# Patient Record
Sex: FEMALE | Race: WHITE | NOT HISPANIC OR LATINO
[De-identification: names, ages, dates, MRNs, and addresses within clinical notes are randomized per-mention and may not be internally consistent; named-entity substitution may affect disease eponyms.]

---

## 2017-02-01 ENCOUNTER — APPOINTMENT (OUTPATIENT)
Dept: OBGYN | Facility: CLINIC | Age: 72
End: 2017-02-01

## 2017-07-11 ENCOUNTER — OUTPATIENT (OUTPATIENT)
Dept: OUTPATIENT SERVICES | Facility: HOSPITAL | Age: 72
LOS: 1 days | Discharge: HOME | End: 2017-07-11

## 2017-07-11 DIAGNOSIS — C50.919 MALIGNANT NEOPLASM OF UNSPECIFIED SITE OF UNSPECIFIED FEMALE BREAST: ICD-10-CM

## 2017-07-14 ENCOUNTER — APPOINTMENT (OUTPATIENT)
Dept: HEMATOLOGY ONCOLOGY | Facility: CLINIC | Age: 72
End: 2017-07-14

## 2017-07-14 VITALS
HEART RATE: 89 BPM | BODY MASS INDEX: 26.53 KG/M2 | SYSTOLIC BLOOD PRESSURE: 132 MMHG | DIASTOLIC BLOOD PRESSURE: 88 MMHG | WEIGHT: 145.06 LBS | RESPIRATION RATE: 16 BRPM | OXYGEN SATURATION: 98 % | TEMPERATURE: 97.8 F

## 2017-10-18 ENCOUNTER — FORM ENCOUNTER (OUTPATIENT)
Age: 72
End: 2017-10-18

## 2017-10-19 ENCOUNTER — OUTPATIENT (OUTPATIENT)
Dept: OUTPATIENT SERVICES | Facility: HOSPITAL | Age: 72
LOS: 1 days | End: 2017-10-19
Payer: MEDICARE

## 2017-10-19 ENCOUNTER — APPOINTMENT (OUTPATIENT)
Dept: ULTRASOUND IMAGING | Facility: IMAGING CENTER | Age: 72
End: 2017-10-19
Payer: MEDICARE

## 2017-10-19 ENCOUNTER — APPOINTMENT (OUTPATIENT)
Dept: MAMMOGRAPHY | Facility: IMAGING CENTER | Age: 72
End: 2017-10-19
Payer: MEDICARE

## 2017-10-19 DIAGNOSIS — C50.919 MALIGNANT NEOPLASM OF UNSPECIFIED SITE OF UNSPECIFIED FEMALE BREAST: ICD-10-CM

## 2017-10-19 PROCEDURE — G0202: CPT | Mod: 26

## 2017-10-19 PROCEDURE — 76641 ULTRASOUND BREAST COMPLETE: CPT | Mod: 26,50

## 2017-10-19 PROCEDURE — 77063 BREAST TOMOSYNTHESIS BI: CPT | Mod: 26

## 2017-10-19 PROCEDURE — 77067 SCR MAMMO BI INCL CAD: CPT

## 2017-10-19 PROCEDURE — 77063 BREAST TOMOSYNTHESIS BI: CPT

## 2017-10-19 PROCEDURE — 76641 ULTRASOUND BREAST COMPLETE: CPT

## 2018-08-21 ENCOUNTER — APPOINTMENT (OUTPATIENT)
Dept: OBGYN | Facility: CLINIC | Age: 73
End: 2018-08-21

## 2018-09-04 ENCOUNTER — APPOINTMENT (OUTPATIENT)
Dept: OBGYN | Facility: CLINIC | Age: 73
End: 2018-09-04
Payer: MEDICARE

## 2018-09-04 PROCEDURE — 99214 OFFICE O/P EST MOD 30 MIN: CPT

## 2018-10-05 ENCOUNTER — OUTPATIENT (OUTPATIENT)
Dept: OUTPATIENT SERVICES | Facility: HOSPITAL | Age: 73
LOS: 1 days | Discharge: HOME | End: 2018-10-05

## 2018-10-05 DIAGNOSIS — C50.919 MALIGNANT NEOPLASM OF UNSPECIFIED SITE OF UNSPECIFIED FEMALE BREAST: ICD-10-CM

## 2018-10-15 ENCOUNTER — APPOINTMENT (OUTPATIENT)
Dept: HEMATOLOGY ONCOLOGY | Facility: CLINIC | Age: 73
End: 2018-10-15
Payer: MEDICARE

## 2018-10-15 VITALS
TEMPERATURE: 98.4 F | OXYGEN SATURATION: 99 % | SYSTOLIC BLOOD PRESSURE: 124 MMHG | DIASTOLIC BLOOD PRESSURE: 72 MMHG | HEART RATE: 106 BPM | RESPIRATION RATE: 16 BRPM | WEIGHT: 141.1 LBS | BODY MASS INDEX: 25.81 KG/M2

## 2018-10-15 DIAGNOSIS — I42.9 CARDIOMYOPATHY, UNSPECIFIED: ICD-10-CM

## 2018-10-15 DIAGNOSIS — G40.909 EPILEPSY, UNSPECIFIED, NOT INTRACTABLE, W/OUT STATUS EPILEPTICUS: ICD-10-CM

## 2018-10-15 PROCEDURE — 99215 OFFICE O/P EST HI 40 MIN: CPT

## 2018-10-17 ENCOUNTER — APPOINTMENT (OUTPATIENT)
Dept: OBGYN | Facility: CLINIC | Age: 73
End: 2018-10-17
Payer: MEDICARE

## 2018-10-17 ENCOUNTER — ASOB RESULT (OUTPATIENT)
Age: 73
End: 2018-10-17

## 2018-10-17 PROCEDURE — 76830 TRANSVAGINAL US NON-OB: CPT

## 2018-10-21 ENCOUNTER — FORM ENCOUNTER (OUTPATIENT)
Age: 73
End: 2018-10-21

## 2018-10-22 ENCOUNTER — APPOINTMENT (OUTPATIENT)
Dept: ULTRASOUND IMAGING | Facility: IMAGING CENTER | Age: 73
End: 2018-10-22

## 2018-10-22 ENCOUNTER — OUTPATIENT (OUTPATIENT)
Dept: OUTPATIENT SERVICES | Facility: HOSPITAL | Age: 73
LOS: 1 days | End: 2018-10-22
Payer: MEDICARE

## 2018-10-22 ENCOUNTER — APPOINTMENT (OUTPATIENT)
Dept: MAMMOGRAPHY | Facility: IMAGING CENTER | Age: 73
End: 2018-10-22
Payer: MEDICARE

## 2018-10-22 DIAGNOSIS — C50.919 MALIGNANT NEOPLASM OF UNSPECIFIED SITE OF UNSPECIFIED FEMALE BREAST: ICD-10-CM

## 2018-10-22 PROCEDURE — 77063 BREAST TOMOSYNTHESIS BI: CPT | Mod: 26

## 2018-10-22 PROCEDURE — 77067 SCR MAMMO BI INCL CAD: CPT | Mod: 26

## 2018-10-22 PROCEDURE — 76641 ULTRASOUND BREAST COMPLETE: CPT | Mod: 26,50

## 2018-10-22 PROCEDURE — 77067 SCR MAMMO BI INCL CAD: CPT

## 2018-10-22 PROCEDURE — 76641 ULTRASOUND BREAST COMPLETE: CPT

## 2018-10-22 PROCEDURE — 77063 BREAST TOMOSYNTHESIS BI: CPT

## 2018-12-03 ENCOUNTER — APPOINTMENT (OUTPATIENT)
Dept: OBGYN | Facility: CLINIC | Age: 73
End: 2018-12-03
Payer: MEDICARE

## 2018-12-03 ENCOUNTER — ASOB RESULT (OUTPATIENT)
Age: 73
End: 2018-12-03

## 2018-12-03 PROCEDURE — 36415 COLL VENOUS BLD VENIPUNCTURE: CPT

## 2018-12-03 PROCEDURE — 76830 TRANSVAGINAL US NON-OB: CPT

## 2018-12-18 ENCOUNTER — OTHER (OUTPATIENT)
Age: 73
End: 2018-12-18

## 2018-12-19 ENCOUNTER — APPOINTMENT (OUTPATIENT)
Dept: OBGYN | Facility: CLINIC | Age: 73
End: 2018-12-19
Payer: MEDICARE

## 2018-12-19 DIAGNOSIS — N84.0 POLYP OF CORPUS UTERI: ICD-10-CM

## 2018-12-19 DIAGNOSIS — R93.89 ABNORMAL FINDINGS ON DIAGNOSTIC IMAGING OF OTHER SPECIFIED BODY STRUCTURES: ICD-10-CM

## 2018-12-19 PROCEDURE — 58558Z: CUSTOM

## 2018-12-21 ENCOUNTER — APPOINTMENT (OUTPATIENT)
Dept: OBGYN | Facility: CLINIC | Age: 73
End: 2018-12-21
Payer: MEDICARE

## 2018-12-21 VITALS
WEIGHT: 143 LBS | BODY MASS INDEX: 26.31 KG/M2 | HEIGHT: 62 IN | DIASTOLIC BLOOD PRESSURE: 84 MMHG | SYSTOLIC BLOOD PRESSURE: 145 MMHG

## 2018-12-21 DIAGNOSIS — N93.9 ABNORMAL UTERINE AND VAGINAL BLEEDING, UNSPECIFIED: ICD-10-CM

## 2018-12-21 PROCEDURE — 99212 OFFICE O/P EST SF 10 MIN: CPT

## 2018-12-25 LAB — CORE LAB BIOPSY: NORMAL

## 2019-02-11 ENCOUNTER — APPOINTMENT (OUTPATIENT)
Dept: OBGYN | Facility: CLINIC | Age: 74
End: 2019-02-11
Payer: MEDICARE

## 2019-02-11 VITALS
HEIGHT: 62 IN | BODY MASS INDEX: 25.76 KG/M2 | SYSTOLIC BLOOD PRESSURE: 157 MMHG | DIASTOLIC BLOOD PRESSURE: 89 MMHG | WEIGHT: 140 LBS

## 2019-02-11 DIAGNOSIS — N83.201 UNSPECIFIED OVARIAN CYST, RIGHT SIDE: ICD-10-CM

## 2019-02-11 PROCEDURE — 82270 OCCULT BLOOD FECES: CPT

## 2019-02-11 PROCEDURE — 87210 SMEAR WET MOUNT SALINE/INK: CPT | Mod: QW

## 2019-02-11 PROCEDURE — G0101: CPT

## 2019-02-11 NOTE — PHYSICAL EXAM
[Awake] : awake [Alert] : alert [Acute Distress] : no acute distress [Mass] : no breast mass [Nipple Discharge] : no nipple discharge [Axillary LAD] : no axillary lymphadenopathy [Soft] : soft [Tender] : non tender [Oriented x3] : oriented to person, place, and time [Vulvar Atrophy] : vulvar atrophy [Normal] : uterus [Atrophy] : atrophy [No Bleeding] : there was no active vaginal bleeding [Uterine Adnexae] : were not tender and not enlarged [No Tenderness] : no rectal tenderness [Occult Blood] : occult blood test from digital rectal exam was negative [Nl Sphincter Tone] : normal sphincter tone [External Hemorrhoid] : an external hemorrhoid [RRR, No Murmurs] : RRR, no murmurs [CTAB] : CTAB [FreeTextEntry4] : 2x4 cm midline mass posterior wall

## 2019-02-14 LAB — CYTOLOGY CVX/VAG DOC THIN PREP: NORMAL

## 2019-09-30 ENCOUNTER — RESULT REVIEW (OUTPATIENT)
Age: 74
End: 2019-09-30

## 2019-10-17 ENCOUNTER — OUTPATIENT (OUTPATIENT)
Dept: OUTPATIENT SERVICES | Facility: HOSPITAL | Age: 74
LOS: 1 days | Discharge: HOME | End: 2019-10-17

## 2019-10-17 DIAGNOSIS — C50.919 MALIGNANT NEOPLASM OF UNSPECIFIED SITE OF UNSPECIFIED FEMALE BREAST: ICD-10-CM

## 2019-10-23 ENCOUNTER — APPOINTMENT (OUTPATIENT)
Dept: HEMATOLOGY ONCOLOGY | Facility: CLINIC | Age: 74
End: 2019-10-23
Payer: MEDICARE

## 2019-10-23 VITALS
DIASTOLIC BLOOD PRESSURE: 73 MMHG | WEIGHT: 137.35 LBS | SYSTOLIC BLOOD PRESSURE: 137 MMHG | TEMPERATURE: 97.8 F | RESPIRATION RATE: 16 BRPM | OXYGEN SATURATION: 97 % | HEART RATE: 67 BPM | BODY MASS INDEX: 25.12 KG/M2

## 2019-10-23 PROCEDURE — 99215 OFFICE O/P EST HI 40 MIN: CPT

## 2019-10-23 NOTE — OB HISTORY
[Currently In Menopause] : currently in menopause [Definite:  ___ (Date)] : the last menstrual period was [unfilled]

## 2019-10-23 NOTE — PHYSICAL EXAM
[Fully active, able to carry on all pre-disease performance without restriction] : Status 0 - Fully active, able to carry on all pre-disease performance without restriction [Normal] : affect appropriate [de-identified] : thyroid surgery scar [de-identified] : midline scar from cardiac surgery, PACEMAKER IN THE LEFT CHEST WLL [de-identified] : left lumpectomy scar

## 2019-10-23 NOTE — ASSESSMENT
[FreeTextEntry1] : This is a 73-year-old lady who was diagnosed with stage IA left breast cancer (ER negative, HER-2/salas positive), status post left lumpectomy and axillary lymph node biopsy on 5/2/2008, status post Taxol Herceptin chemotherapy and radiation therapy.\par \par Breast ca- Clinically JOSHUA. Mammogram and sonogram due this month. Prescripiton given. 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 \par Cardiomyopathy- stable. F/u cardio. No cardiac side effects of Herceptin.\par Ovarian cyst- F/B OB GYN. \par Seizures- Not cancer related. Controlled with anti epileptics. .\par Will obtain Recent BW from Dr Rubin\par Osteopenia- Continue ca+vit D. \par \par EDUCATED ABOUT S/SX OF RECURRENCE. Unlikely to recur after 11 yrs. REFERRAL MADE TO BE F/B [facility]\par \par RTO 1 y WITH LIVAN

## 2019-10-23 NOTE — HISTORY OF PRESENT ILLNESS
[Disease: _____________________] : Disease: [unfilled] [T: ___] : T[unfilled] [M: ___] : M[unfilled] [N: ___] : N[unfilled] [AJCC Stage: ____] : AJCC Stage: [unfilled] [de-identified] : This is a 74-year-old lady who was diagnosed with stage IA left breast cancer (ER negative, HER-2/salas positive), status post left lumpectomy and axillary lymph node biopsy on 5/2/2008, status post Taxol Herceptin chemotherapy  and radiation therapy. [de-identified] : poorly differentiated invasive ductal carcinoma, ER/SC negative HER-2/salas overexpression. [de-identified] : Annual followup visit for stage IA left breast cancer (ER negative, HER-2/salas positive), status post left lumpectomy and axillary lymph node biopsy on 5/2/2008, status post Taxol Herceptin chemotherapy  and radiation therapy. HERE FOR ANNUAL VISIT\par She underwent open heart surgery and valve repair in 4/2019. She is doing well. No cardio pulm sx. Sister and daughter helping out. She was in cardiac rehab x 2 m\par She has h/o migraine induced seizures. No seizures since last visit. She is f/b neuro\par Mammo/sono 10/2018: BIRADS 2 due this month\par GYN- Dr Chavez, She is followed for ovarian cyst\par Dexa 2018, osteopenia, f/w rheum, takes ca+vit D\par No bone pain. no wt loss, energy ok, appetite good.\par has mild neuropathy in right leg, taking cymbalta and gabapentin\par Metformin switched to levemir for DM\par ROS negative\par Routine BW by PCP (Caryn)- normal.

## 2019-10-28 ENCOUNTER — FORM ENCOUNTER (OUTPATIENT)
Age: 74
End: 2019-10-28

## 2019-10-29 ENCOUNTER — OUTPATIENT (OUTPATIENT)
Dept: OUTPATIENT SERVICES | Facility: HOSPITAL | Age: 74
LOS: 1 days | End: 2019-10-29
Payer: MEDICARE

## 2019-10-29 ENCOUNTER — APPOINTMENT (OUTPATIENT)
Dept: MAMMOGRAPHY | Facility: IMAGING CENTER | Age: 74
End: 2019-10-29
Payer: MEDICARE

## 2019-10-29 ENCOUNTER — APPOINTMENT (OUTPATIENT)
Dept: ULTRASOUND IMAGING | Facility: IMAGING CENTER | Age: 74
End: 2019-10-29
Payer: MEDICARE

## 2019-10-29 DIAGNOSIS — C50.919 MALIGNANT NEOPLASM OF UNSPECIFIED SITE OF UNSPECIFIED FEMALE BREAST: ICD-10-CM

## 2019-10-29 PROCEDURE — 76641 ULTRASOUND BREAST COMPLETE: CPT | Mod: 26,50

## 2019-10-29 PROCEDURE — 77063 BREAST TOMOSYNTHESIS BI: CPT | Mod: 26

## 2019-10-29 PROCEDURE — 77063 BREAST TOMOSYNTHESIS BI: CPT

## 2019-10-29 PROCEDURE — 76641 ULTRASOUND BREAST COMPLETE: CPT

## 2019-10-29 PROCEDURE — 77067 SCR MAMMO BI INCL CAD: CPT | Mod: 26

## 2019-10-29 PROCEDURE — 77067 SCR MAMMO BI INCL CAD: CPT

## 2020-03-02 ENCOUNTER — APPOINTMENT (OUTPATIENT)
Dept: OBGYN | Facility: CLINIC | Age: 75
End: 2020-03-02
Payer: MEDICARE

## 2020-03-02 VITALS
BODY MASS INDEX: 26.13 KG/M2 | SYSTOLIC BLOOD PRESSURE: 114 MMHG | WEIGHT: 142 LBS | HEIGHT: 62 IN | DIASTOLIC BLOOD PRESSURE: 74 MMHG

## 2020-03-02 DIAGNOSIS — D21.9 BENIGN NEOPLASM OF CONNECTIVE AND OTHER SOFT TISSUE, UNSPECIFIED: ICD-10-CM

## 2020-03-02 DIAGNOSIS — N90.5 ATROPHY OF VULVA: ICD-10-CM

## 2020-03-02 DIAGNOSIS — N83.202 UNSPECIFIED OVARIAN CYST, LEFT SIDE: ICD-10-CM

## 2020-03-02 DIAGNOSIS — K64.4 RESIDUAL HEMORRHOIDAL SKIN TAGS: ICD-10-CM

## 2020-03-02 DIAGNOSIS — N95.2 POSTMENOPAUSAL ATROPHIC VAGINITIS: ICD-10-CM

## 2020-03-02 PROCEDURE — 87210 SMEAR WET MOUNT SALINE/INK: CPT | Mod: QW

## 2020-03-02 PROCEDURE — 99214 OFFICE O/P EST MOD 30 MIN: CPT

## 2020-03-02 NOTE — PHYSICAL EXAM
[Acute Distress] : no acute distress [Awake] : awake [Alert] : alert [Mass] : no breast mass [Soft] : soft [Nipple Discharge] : no nipple discharge [Axillary LAD] : no axillary lymphadenopathy [Tender] : non tender [Oriented x3] : oriented to person, place, and time [Vulvar Atrophy] : vulvar atrophy [Atrophy] : atrophy [Normal] : uterus [No Tenderness] : no rectal tenderness [Uterine Adnexae] : were not tender and not enlarged [No Bleeding] : there was no active vaginal bleeding [Nl Sphincter Tone] : normal sphincter tone [Occult Blood] : occult blood test from digital rectal exam was negative [RRR, No Murmurs] : RRR, no murmurs [External Hemorrhoid] : an external hemorrhoid [FreeTextEntry4] : 2x4 cm midline mass posterior wall [CTAB] : CTAB

## 2020-10-16 ENCOUNTER — OUTPATIENT (OUTPATIENT)
Dept: OUTPATIENT SERVICES | Facility: HOSPITAL | Age: 75
LOS: 1 days | Discharge: HOME | End: 2020-10-16

## 2020-10-16 DIAGNOSIS — U07.0 VAPING-RELATED DISORDER: ICD-10-CM

## 2020-10-19 ENCOUNTER — APPOINTMENT (OUTPATIENT)
Dept: HEMATOLOGY ONCOLOGY | Facility: CLINIC | Age: 75
End: 2020-10-19

## 2020-11-23 ENCOUNTER — OUTPATIENT (OUTPATIENT)
Dept: OUTPATIENT SERVICES | Facility: HOSPITAL | Age: 75
LOS: 1 days | Discharge: HOME | End: 2020-11-23

## 2020-11-23 DIAGNOSIS — C50.919 MALIGNANT NEOPLASM OF UNSPECIFIED SITE OF UNSPECIFIED FEMALE BREAST: ICD-10-CM

## 2020-11-30 ENCOUNTER — APPOINTMENT (OUTPATIENT)
Dept: HEMATOLOGY ONCOLOGY | Facility: CLINIC | Age: 75
End: 2020-11-30
Payer: MEDICARE

## 2020-11-30 VITALS
DIASTOLIC BLOOD PRESSURE: 85 MMHG | HEART RATE: 83 BPM | BODY MASS INDEX: 25.76 KG/M2 | RESPIRATION RATE: 16 BRPM | SYSTOLIC BLOOD PRESSURE: 128 MMHG | OXYGEN SATURATION: 98 % | HEIGHT: 61.97 IN | WEIGHT: 139.99 LBS | TEMPERATURE: 97.2 F

## 2020-11-30 PROCEDURE — 99215 OFFICE O/P EST HI 40 MIN: CPT

## 2020-11-30 RX ORDER — PHYTONADIONE 5 MG/1
5 TABLET ORAL
Qty: 7 | Refills: 0 | Status: COMPLETED | COMMUNITY
Start: 2020-11-11

## 2020-11-30 RX ORDER — WARFARIN 4 MG/1
TABLET ORAL
Refills: 0 | Status: ACTIVE | COMMUNITY

## 2020-11-30 RX ORDER — ICOSAPENT ETHYL 1000 MG/1
1 CAPSULE ORAL
Qty: 120 | Refills: 0 | Status: ACTIVE | COMMUNITY
Start: 2020-09-02

## 2020-11-30 RX ORDER — ERGOCALCIFEROL 1.25 MG/1
1.25 MG CAPSULE, LIQUID FILLED ORAL
Qty: 8 | Refills: 0 | Status: ACTIVE | COMMUNITY
Start: 2020-09-22

## 2020-11-30 RX ORDER — ALIROCUMAB 150 MG/ML
150 INJECTION, SOLUTION SUBCUTANEOUS
Qty: 2 | Refills: 0 | Status: ACTIVE | COMMUNITY
Start: 2020-11-03

## 2020-11-30 RX ORDER — SODIUM SULFATE, POTASSIUM SULFATE, MAGNESIUM SULFATE 17.5; 3.13; 1.6 G/ML; G/ML; G/ML
17.5-3.13-1.6 SOLUTION, CONCENTRATE ORAL
Qty: 354 | Refills: 0 | Status: COMPLETED | COMMUNITY
Start: 2020-07-03

## 2020-11-30 RX ORDER — MIDODRINE HYDROCHLORIDE 5 MG/1
5 TABLET ORAL
Qty: 270 | Refills: 0 | Status: ACTIVE | COMMUNITY
Start: 2020-08-05

## 2020-11-30 RX ORDER — GABAPENTIN 600 MG/1
600 TABLET, COATED ORAL
Qty: 180 | Refills: 0 | Status: ACTIVE | COMMUNITY
Start: 2020-09-22

## 2020-11-30 RX ORDER — PEN NEEDLE, DIABETIC 29 G X1/2"
29G X 12.7MM NEEDLE, DISPOSABLE MISCELLANEOUS
Qty: 100 | Refills: 0 | Status: ACTIVE | COMMUNITY
Start: 2020-02-06

## 2020-11-30 RX ORDER — EVOLOCUMAB 140 MG/ML
140 INJECTION, SOLUTION SUBCUTANEOUS
Qty: 2 | Refills: 0 | Status: COMPLETED | COMMUNITY
Start: 2020-06-02

## 2020-11-30 RX ORDER — BLOOD SUGAR DIAGNOSTIC
STRIP MISCELLANEOUS
Qty: 100 | Refills: 0 | Status: ACTIVE | COMMUNITY
Start: 2020-09-15

## 2020-11-30 RX ORDER — MULTIVITAMIN
TABLET ORAL
Refills: 0 | Status: ACTIVE | COMMUNITY

## 2020-12-02 ENCOUNTER — APPOINTMENT (OUTPATIENT)
Dept: MAMMOGRAPHY | Facility: IMAGING CENTER | Age: 75
End: 2020-12-02
Payer: MEDICARE

## 2020-12-02 ENCOUNTER — RESULT REVIEW (OUTPATIENT)
Age: 75
End: 2020-12-02

## 2020-12-02 ENCOUNTER — OUTPATIENT (OUTPATIENT)
Dept: OUTPATIENT SERVICES | Facility: HOSPITAL | Age: 75
LOS: 1 days | End: 2020-12-02
Payer: MEDICARE

## 2020-12-02 ENCOUNTER — APPOINTMENT (OUTPATIENT)
Dept: ULTRASOUND IMAGING | Facility: IMAGING CENTER | Age: 75
End: 2020-12-02
Payer: MEDICARE

## 2020-12-02 DIAGNOSIS — C50.919 MALIGNANT NEOPLASM OF UNSPECIFIED SITE OF UNSPECIFIED FEMALE BREAST: ICD-10-CM

## 2020-12-02 PROCEDURE — 76641 ULTRASOUND BREAST COMPLETE: CPT | Mod: 26,50

## 2020-12-02 PROCEDURE — 77067 SCR MAMMO BI INCL CAD: CPT

## 2020-12-02 PROCEDURE — 77063 BREAST TOMOSYNTHESIS BI: CPT

## 2020-12-02 PROCEDURE — 77063 BREAST TOMOSYNTHESIS BI: CPT | Mod: 26

## 2020-12-02 PROCEDURE — 77067 SCR MAMMO BI INCL CAD: CPT | Mod: 26

## 2020-12-02 PROCEDURE — 76641 ULTRASOUND BREAST COMPLETE: CPT

## 2021-11-30 ENCOUNTER — OUTPATIENT (OUTPATIENT)
Dept: OUTPATIENT SERVICES | Facility: HOSPITAL | Age: 76
LOS: 1 days | Discharge: HOME | End: 2021-11-30

## 2021-11-30 DIAGNOSIS — C50.919 MALIGNANT NEOPLASM OF UNSPECIFIED SITE OF UNSPECIFIED FEMALE BREAST: ICD-10-CM

## 2021-12-01 ENCOUNTER — APPOINTMENT (OUTPATIENT)
Dept: HEMATOLOGY ONCOLOGY | Facility: CLINIC | Age: 76
End: 2021-12-01
Payer: MEDICARE

## 2021-12-03 ENCOUNTER — RESULT REVIEW (OUTPATIENT)
Age: 76
End: 2021-12-03

## 2021-12-03 ENCOUNTER — APPOINTMENT (OUTPATIENT)
Dept: MAMMOGRAPHY | Facility: IMAGING CENTER | Age: 76
End: 2021-12-03
Payer: MEDICARE

## 2021-12-03 ENCOUNTER — OUTPATIENT (OUTPATIENT)
Dept: OUTPATIENT SERVICES | Facility: HOSPITAL | Age: 76
LOS: 1 days | End: 2021-12-03
Payer: MEDICARE

## 2021-12-03 ENCOUNTER — APPOINTMENT (OUTPATIENT)
Dept: ULTRASOUND IMAGING | Facility: IMAGING CENTER | Age: 76
End: 2021-12-03
Payer: MEDICARE

## 2021-12-03 DIAGNOSIS — Z00.8 ENCOUNTER FOR OTHER GENERAL EXAMINATION: ICD-10-CM

## 2021-12-03 PROCEDURE — 77067 SCR MAMMO BI INCL CAD: CPT

## 2021-12-03 PROCEDURE — 76641 ULTRASOUND BREAST COMPLETE: CPT | Mod: 26,50

## 2021-12-03 PROCEDURE — 76641 ULTRASOUND BREAST COMPLETE: CPT

## 2021-12-03 PROCEDURE — 77063 BREAST TOMOSYNTHESIS BI: CPT | Mod: 26

## 2021-12-03 PROCEDURE — 77067 SCR MAMMO BI INCL CAD: CPT | Mod: 26

## 2021-12-03 PROCEDURE — 77063 BREAST TOMOSYNTHESIS BI: CPT

## 2021-12-22 ENCOUNTER — APPOINTMENT (OUTPATIENT)
Dept: HEMATOLOGY ONCOLOGY | Facility: CLINIC | Age: 76
End: 2021-12-22
Payer: MEDICARE

## 2021-12-22 VITALS
HEART RATE: 90 BPM | TEMPERATURE: 97.4 F | DIASTOLIC BLOOD PRESSURE: 72 MMHG | SYSTOLIC BLOOD PRESSURE: 105 MMHG | OXYGEN SATURATION: 97 % | BODY MASS INDEX: 26.18 KG/M2 | RESPIRATION RATE: 16 BRPM | WEIGHT: 140.43 LBS | HEIGHT: 61.42 IN

## 2021-12-22 PROCEDURE — 99215 OFFICE O/P EST HI 40 MIN: CPT

## 2021-12-22 RX ORDER — OMEGA-3/DHA/EPA/FISH OIL 300-1000MG
CAPSULE ORAL
Refills: 0 | Status: DISCONTINUED | COMMUNITY
End: 2021-12-22

## 2021-12-22 NOTE — OB HISTORY
[Definite:  ___ (Date)] : the last menstrual period was [unfilled] [Currently In Menopause] : currently in menopause

## 2022-01-10 NOTE — PHYSICAL EXAM
[Fully active, able to carry on all pre-disease performance without restriction] : Status 0 - Fully active, able to carry on all pre-disease performance without restriction [Normal] : affect appropriate [de-identified] : anicteric, no conjunctival infection noted.  [de-identified] : thyroid surgery scar noted, no palpable masses noted [de-identified] : midline scar from cardiac surgery, pacemaker palpable in left chest wall. [de-identified] : no LE edema noted, 2+PP  [de-identified] : No skin or nipple changes noted bilaterally.  Left breast with well healed surgical scar.  No discrete palpable masses noted bilaterally.  No palpable axillary adenopathy noted bilaterally. [de-identified] : Large lipoma present upper left thigh, soft, nontender.

## 2022-01-10 NOTE — HISTORY OF PRESENT ILLNESS
[Disease: _____________________] : Disease: [unfilled] [T: ___] : T[unfilled] [N: ___] : N[unfilled] [M: ___] : M[unfilled] [AJCC Stage: ____] : AJCC Stage: [unfilled] [de-identified] : 74 y/o female who presents for survivorship visit/follow up.  \par \par On routine mammogram in 4/2008 a new, irregular mass with calcification in the UOQ of left breast was discovered.  Sonogram showed a 19 mm x 12 mm x 11 mm noncystic mass, lobular in contour and heterogeneous in echogenicity.  Biopsy was recommended.  BIRADS 5.  Biopsy was done, 4/15/2008 and revealed poorly differentiated invasive duct carcinoma with necrosis.  ER negative, MI negative, HER2 positive. \par \par She underwent a left partial lumpectomy with SNLB, 5/2/2008 (Dr. Snow).  Final pathology: Two tumors in the upper outer quadrant of the left breast with intraductal and infiltrating, poorly differentiated duct carcinoma, solid type, high nuclear grade, associtaed with necrosis, calcification, and lobular extension.  Margins of both tumors were negative for tumor.  The original tumor measured 1 cm in greatest dimension.  The The additional nodule was more lateral in the upper otuer quadrant and mearued 1.5 cm i n greatest dimension.  Both tumors wer comprised of 20% DCIS.  Margins were within 1 mm, but additional tissue was resected.  3 [place] nodes and 2 additional nodes were negative for tomor.  T1cN0 (i-)(sn)M0.\par \par She was started on Taxol/Herceptin on the [facility] 0373 protocol (A phase II adjuvant pacilitaxel and trastuzumab, for node negative, HER2 positive breast cancer).  She completed 1  year of herceptin 6/17/2009.  She also received adjuvant radiation therapy.  She has been JOSHUA since that time. \par \par Pertinent History:\par PMD: Dr. Rubin\par HTN, HLD\par Endocrinologist: Dr. Robles- DM\par GYN: Dr. Chavez, follows ovarian cyst, PAP every two years. \par Cardio: Dr. Washington- S/p open heart surgery and mitral valve replaced, Bovine valve, tricuspid valve repaired, PPM placed in 4/2019, h/o A.Fib on warfarin \par She has h/o "mini" seizures, last seizure activity one month ago.  On Trokendi, f/b neurology\par mild neuropathy in right leg and tips of fingers, taking cymbalta and gabapentin\par h/o thyroid cancer s/p thyroidectomy 2010. Followed by Dr. Robles\par Dexa 6/24/20, osteopenia, f/w rheum, Dr. Long takes ca+vit D\par has h/o anemia, follows with hematology, Dr. Richter\par Enodscopy/Colonoscopy 2019- tubular adenoma removed, repeat 5 years.\par The patient is a retired , she has one daughter and two grandchildren that live in [place].  She lives alone, she lost her significant other of 17 years last year, 2019.\par \par  [de-identified] : poorly differentiated invasive ductal carcinoma, ER/[place] negative HER-2/salas overexpression. [de-identified] : The patient presents today to establish ongoing survivorship care with me.  She is a former patient of Dr. Joyce Ayers, last seen 10/23/19.  She is doing well with no breast complaints. She follows up frequently with her PMD for blood work and exams.  She is overdue for her mammogram and sonogram. She was due in October; however had to cancel apt because she had a false positive covid test at that time.  Repeat swabs x 2 were negative.  She states she is going to reschedule apt.  She has been isolating at home, goes out only for doctor appointment.  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  She states her balance is sometime off because of this.  She is on cymbalta and gabapentin with improvement of symptoms.  She also feels as if she has "chemo brain" and is forgetful at time. She also states she has lipoma in upper left thigh that has been partial removed twice and biopsied in past by Dr. Montalvo.  Each time lipoma returns. She states it can not be completely removed because of location. It is monitored in past by Dr. Montalvo, patient states it has been stable with no change.  She states she has some hearing loss left ear and is pending hearing aide.  Other than that she has no complaints.\par 12/22/21 S/P admission at [facility]. for lipoma of the left leg F/b  Dr. Mascorro pending final pathology She reports ongoing neuropathy to right foot \par but symptoms in hands and left leg resolved, continues to take gabapentin 600 mg BID and cymbalta 60 mg OD prn.\par MAmmo/Sono 12/3/21 BIRADS 2\par DEXA 6/2020 osteopenia continue Vit D and CA suppls.\par colonoscopy 2021\par Eye MD scheduled 1/2022\par F/b uro GYN for stress incontinence\par PCP 12/20/2021. Dr Rubin at [facility] Had Covid vaccine x 3 doses, most recent dose 9/2021

## 2022-01-10 NOTE — ASSESSMENT
[FreeTextEntry1] : This is a 73-year-old lady who was diagnosed with stage IA left breast cancer (ER negative, HER-2/salas positive), status post left lumpectomy and axillary lymph node biopsy on 5/2/2008, status post Taxol Herceptin chemotherapy and radiation therapy. Presents for follow up.\par S/P 12/2021 admission at [facility]. for lipoma of the left leg F/b  Dr. 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 \par \par -Cardiomyopathy- stable. F/u cardio. No cardiac side effects of Herceptin.\par -Thyroid cancer- follows routinely with endocrinology.  Recent sonogram okay per patient.\par Ovarian cyst- F/B OB GYN.  PAP every two years, next due 2021 per patient. \par -Seizures- Not cancer related. Controlled with anti epileptics. \par -follow up routinely with Dr. Rubin for exam and  BW. PT, INR monitored by him once a week.\par -Osteopenia- followed by Rheumatology, last DEXA 6/24/20. Continue VIt D and Calcium.  \par -follow up one year, sooner if issues arise.\par -RHM: age appropriate screening discussed, patient states she is UTD.  She states she has annual skin check with dermatologist prn.\par RTO 1 year or sooner prn\par \par

## 2022-01-10 NOTE — REASON FOR VISIT
[Follow-Up Visit] : a follow-up [Initial Consultation] : an initial consultation [FreeTextEntry2] : breast cancer

## 2022-07-19 ENCOUNTER — NON-APPOINTMENT (OUTPATIENT)
Age: 77
End: 2022-07-19

## 2022-10-13 ENCOUNTER — LABORATORY RESULT (OUTPATIENT)
Age: 77
End: 2022-10-13

## 2022-10-13 ENCOUNTER — APPOINTMENT (OUTPATIENT)
Dept: ENDOCRINOLOGY | Facility: CLINIC | Age: 77
End: 2022-10-13

## 2022-10-13 VITALS
HEIGHT: 61 IN | DIASTOLIC BLOOD PRESSURE: 60 MMHG | SYSTOLIC BLOOD PRESSURE: 122 MMHG | BODY MASS INDEX: 24.17 KG/M2 | WEIGHT: 128 LBS

## 2022-10-13 LAB
GLUCOSE BLDC GLUCOMTR-MCNC: 219
HBA1C MFR BLD HPLC: 6.7

## 2022-10-13 PROCEDURE — 36415 COLL VENOUS BLD VENIPUNCTURE: CPT

## 2022-10-13 PROCEDURE — 82962 GLUCOSE BLOOD TEST: CPT

## 2022-10-13 PROCEDURE — 99213 OFFICE O/P EST LOW 20 MIN: CPT | Mod: 25

## 2022-10-13 PROCEDURE — 83036 HEMOGLOBIN GLYCOSYLATED A1C: CPT | Mod: QW

## 2022-10-13 RX ORDER — TORSEMIDE 5 MG/1
5 TABLET ORAL
Qty: 30 | Refills: 0 | Status: DISCONTINUED | COMMUNITY
Start: 2020-03-31 | End: 2022-10-13

## 2022-10-13 RX ORDER — BLOOD-GLUCOSE METER
W/DEVICE EACH MISCELLANEOUS
Qty: 1 | Refills: 0 | Status: ACTIVE | COMMUNITY
Start: 2022-10-13 | End: 1900-01-01

## 2022-10-14 LAB
ALBUMIN SERPL ELPH-MCNC: 4.1 G/DL
ALP BLD-CCNC: 74 U/L
ALT SERPL-CCNC: 9 U/L
ANION GAP SERPL CALC-SCNC: 15 MMOL/L
AST SERPL-CCNC: 20 U/L
BILIRUB SERPL-MCNC: 0.3 MG/DL
BUN SERPL-MCNC: 25 MG/DL
CALCIUM SERPL-MCNC: 8.8 MG/DL
CHLORIDE SERPL-SCNC: 108 MMOL/L
CHOLEST SERPL-MCNC: 212 MG/DL
CO2 SERPL-SCNC: 22 MMOL/L
CREAT SERPL-MCNC: 1.27 MG/DL
EGFR: 44 ML/MIN/1.73M2
GLUCOSE SERPL-MCNC: 232 MG/DL
HDLC SERPL-MCNC: 46 MG/DL
LDLC SERPL CALC-MCNC: 117 MG/DL
NONHDLC SERPL-MCNC: 166 MG/DL
POTASSIUM SERPL-SCNC: 4.6 MMOL/L
PROT SERPL-MCNC: 6.4 G/DL
SODIUM SERPL-SCNC: 145 MMOL/L
T4 FREE SERPL-MCNC: 1.7 NG/DL
THYROGLOB AB SERPL-ACNC: <20 IU/ML
THYROGLOB SERPL-MCNC: <0.2 NG/ML
TRIGL SERPL-MCNC: 245 MG/DL
TSH SERPL-ACNC: 0.05 UIU/ML

## 2022-10-16 LAB — THYROGLOB AB SERPL IA-ACNC: <1.8 IU/ML

## 2022-10-16 NOTE — HISTORY OF PRESENT ILLNESS
[FreeTextEntry1] : VIJI GUZMAN  is a 77 year old female with past medical history of left breast cancer., now in remission, liposarcaoma, cAD s/p PCABG, type 2 DM, hypothyroidism, who presents today for management of diabetes and hypothyroidism\par \par Diagnosed with DM 20 years. She was on both insulin and pills. She is currently taking Lantus 12U at bedtime. She checks fs 3-4/dya, AM fasting ranges from , 2hr post meals ranges from 100-170. She denies recent hypoglycemic episodes or symptoms.She needs new glucometer. Notes she takes Cymbalta and Gabapentin for neuropathy. Praluent, Vascepa, and Lisinopril. \par Used to take Metformin 5000mg po BID however stopped due to CKD. She has seen Dr Miguel at [facility], no h/o retinoapthy \par \par Thyroid cancer diagnosed in 2010, total thyroidectomy in [facility], Dr Montalvo and did not receive any SAMSON therapy. Thyroid sonogram unknown last imaging. Taking Synthroid brand 75mcg daily.No previous records to review today.\par \par Patient denies heat or cold intolerance, dyspnea, dysphagia, dysphonia, changes in bowel habits including diarrhea or constipation or any recent change in weight.  She notes her circadian rhythm rhythm is off, erratic sleeping, she doesn’t eat 3 meals/day.  \par Breakfast: oatmeal, cereal, leftover dinner (often half week wakes up n\par lunch: 1x/week bagel and cc, coffee black, p&B sandwich\par DInner: pizza 1x/week, pasta occasionally. \par No soda or juice. Drinks water Ha V8 Juice 2 cans/week. \par \par Family Hx: maternal aunt- DM . No known thyroid disease

## 2022-10-16 NOTE — PHYSICAL EXAM
[Alert] : alert [No Acute Distress] : no acute distress [Well Developed] : well developed [Normal Voice/Communication] : normal voice communication [Normal Sclera/Conjunctiva] : normal sclera/conjunctiva [EOMI] : extra ocular movement intact [No Proptosis] : no proptosis [No Lid Lag] : no lid lag [No LAD] : no lymphadenopathy [Supple] : the neck was supple [Well Healed Scar] : well healed scar [No Respiratory Distress] : no respiratory distress [No Accessory Muscle Use] : no accessory muscle use [Normal Rate and Effort] : normal respiratory rate and effort [No Edema] : no peripheral edema [Normal Supraclavicular Nodes] : no supraclavicular lymphadenopathy [Normal Anterior Cervical Nodes] : no anterior cervical lymphadenopathy [Normal Posterior Cervical Nodes] : no posterior cervical lymphadenopathy [No Stigmata of Cushings Syndrome] : no stigmata of Cushings Syndrome [Normal Gait] : normal gait [No Clubbing, Cyanosis] : no clubbing  or cyanosis of the fingernails [No Involuntary Movements] : no involuntary movements were seen [Right foot was examined, including] : right foot ~C was examined, including visual inspection with sensory and pulse exams [Left foot was examined, including] : left foot ~C was examined, including visual inspection with sensory and pulse exams [Normal] : normal [2+] : 2+ in the dorsalis pedis [No Tremors] : no tremors [Normal Sensation on Monofilament Testing] : normal sensation on monofilament testing of lower extremities [Oriented x3] : oriented to person, place, and time [Normal Insight/Judgement] : insight and judgment were intact [Acanthosis Nigricans] : no acanthosis nigricans [Foot Ulcers] : no foot ulcers [Diminished Throughout Both Feet] : normal tactile sensation with monofilament testing throughout both feet

## 2022-10-16 NOTE — ASSESSMENT
[Diabetes Foot Care] : diabetes foot care [Long Term Vascular Complications] : long term vascular complications of diabetes [Carbohydrate Consistent Diet] : carbohydrate consistent diet [Importance of Diet and Exercise] : importance of diet and exercise to improve glycemic control, achieve weight loss and improve cardiovascular health [Action and use of Insulin] : action and use of short and long-acting insulin [Injection Technique, Storage, Sharps Disposal] : injection technique, storage, and sharps disposal [Retinopathy Screening] : Patient was referred to ophthalmology for retinopathy screening [Diabetic Medications] : Risks and benefits of diabetic medications were discussed [FreeTextEntry1] : Type 2 DM\par POC HgbA1c today is 6.7%, at goal\par Reviewed home glucose monitoring log, noted AM fasting slightly above goal. Has underlying CKD\par Increase Lantus 14U qhs \par Up to date with ophthalmology, no h/o diabetic retinopathy. No diabetic foot ulcers\par On Praluent and Vascepa.\par \par Thyroid cancer\par Will try to obtain records from [facility]/Dr Montalvo. Advised patient if she has records to send to this office\par Clinically euthyroid\par Currently on Synthroid 75mcg daily\par Bloodwork was collected in office today, will f/u results accordingly.\par \par \par Addendum: reviewed labs from 10/13/22: Tg, Tg Ab negative. TSH is appropriately suppressed. Continue Synthroid 75mcg daily.\par \par Answered all questions today; patient verbalized understanding of the above\par RTC in 3 months\par

## 2022-10-18 ENCOUNTER — NON-APPOINTMENT (OUTPATIENT)
Age: 77
End: 2022-10-18

## 2022-12-16 ENCOUNTER — OUTPATIENT (OUTPATIENT)
Dept: OUTPATIENT SERVICES | Facility: HOSPITAL | Age: 77
LOS: 1 days | Discharge: HOME | End: 2022-12-16

## 2022-12-16 DIAGNOSIS — C50.919 MALIGNANT NEOPLASM OF UNSPECIFIED SITE OF UNSPECIFIED FEMALE BREAST: ICD-10-CM

## 2022-12-17 ENCOUNTER — OUTPATIENT (OUTPATIENT)
Dept: OUTPATIENT SERVICES | Facility: HOSPITAL | Age: 77
LOS: 1 days | End: 2022-12-17
Payer: MEDICARE

## 2022-12-17 ENCOUNTER — APPOINTMENT (OUTPATIENT)
Dept: ULTRASOUND IMAGING | Facility: IMAGING CENTER | Age: 77
End: 2022-12-17

## 2022-12-17 ENCOUNTER — RESULT REVIEW (OUTPATIENT)
Age: 77
End: 2022-12-17

## 2022-12-17 ENCOUNTER — APPOINTMENT (OUTPATIENT)
Dept: MAMMOGRAPHY | Facility: IMAGING CENTER | Age: 77
End: 2022-12-17

## 2022-12-17 DIAGNOSIS — C50.919 MALIGNANT NEOPLASM OF UNSPECIFIED SITE OF UNSPECIFIED FEMALE BREAST: ICD-10-CM

## 2022-12-17 PROCEDURE — 77067 SCR MAMMO BI INCL CAD: CPT | Mod: 26

## 2022-12-17 PROCEDURE — 76641 ULTRASOUND BREAST COMPLETE: CPT

## 2022-12-17 PROCEDURE — 77067 SCR MAMMO BI INCL CAD: CPT

## 2022-12-17 PROCEDURE — 76641 ULTRASOUND BREAST COMPLETE: CPT | Mod: 26,50

## 2022-12-17 PROCEDURE — 77063 BREAST TOMOSYNTHESIS BI: CPT | Mod: 26

## 2022-12-17 PROCEDURE — 77063 BREAST TOMOSYNTHESIS BI: CPT

## 2022-12-22 ENCOUNTER — APPOINTMENT (OUTPATIENT)
Dept: HEMATOLOGY ONCOLOGY | Facility: CLINIC | Age: 77
End: 2022-12-22

## 2022-12-22 VITALS
TEMPERATURE: 96.8 F | DIASTOLIC BLOOD PRESSURE: 66 MMHG | OXYGEN SATURATION: 96 % | HEART RATE: 96 BPM | BODY MASS INDEX: 24.16 KG/M2 | RESPIRATION RATE: 16 BRPM | SYSTOLIC BLOOD PRESSURE: 109 MMHG | WEIGHT: 127.87 LBS

## 2022-12-22 DIAGNOSIS — G62.0 DRUG-INDUCED POLYNEUROPATHY: ICD-10-CM

## 2022-12-22 DIAGNOSIS — G62.9 POLYNEUROPATHY, UNSPECIFIED: ICD-10-CM

## 2022-12-22 DIAGNOSIS — T45.1X5A DRUG-INDUCED POLYNEUROPATHY: ICD-10-CM

## 2022-12-22 PROCEDURE — 99214 OFFICE O/P EST MOD 30 MIN: CPT

## 2022-12-22 RX ORDER — TOPIRAMATE 50 MG/1
50 CAPSULE, EXTENDED RELEASE ORAL
Qty: 90 | Refills: 0 | Status: DISCONTINUED | COMMUNITY
Start: 2020-04-16 | End: 2022-12-22

## 2022-12-22 RX ORDER — TOPIRAMATE 100 MG/1
100 CAPSULE, EXTENDED RELEASE ORAL
Refills: 0 | Status: ACTIVE | COMMUNITY
Start: 2022-12-22

## 2022-12-22 RX ORDER — TOPIRAMATE 25 MG/1
25 CAPSULE, EXTENDED RELEASE ORAL
Qty: 30 | Refills: 0 | Status: DISCONTINUED | COMMUNITY
Start: 2020-09-16 | End: 2022-12-22

## 2022-12-27 PROBLEM — G62.0 CHEMOTHERAPY-INDUCED NEUROPATHY: Status: ACTIVE | Noted: 2018-10-15

## 2022-12-27 NOTE — HISTORY OF PRESENT ILLNESS
[Disease: _____________________] : Disease: [unfilled] [T: ___] : T[unfilled] [N: ___] : N[unfilled] [M: ___] : M[unfilled] [AJCC Stage: ____] : AJCC Stage: [unfilled] [de-identified] : 74 y/o female who presents for survivorship visit/follow up.  \par \par On routine mammogram in 4/2008 a new, irregular mass with calcification in the UOQ of left breast was discovered.  Sonogram showed a 19 mm x 12 mm x 11 mm noncystic mass, lobular in contour and heterogeneous in echogenicity.  Biopsy was recommended.  BIRADS 5.  Biopsy was done, 4/15/2008 and revealed poorly differentiated invasive duct carcinoma with necrosis.  ER negative, [place] negative, HER2 positive. \par \par She underwent a left partial lumpectomy with SNLB, 5/2/2008 (Dr. Snow).  Final pathology: Two tumors in the upper outer quadrant of the left breast with intraductal and infiltrating, poorly differentiated duct carcinoma, solid type, high nuclear grade, associtaed with necrosis, calcification, and lobular extension.  Margins of both tumors were negative for tumor.  The original tumor measured 1 cm in greatest dimension.  The The additional nodule was more lateral in the upper otuer quadrant and mearued 1.5 cm i n greatest dimension.  Both tumors wer comprised of 20% DCIS.  Margins were within 1 mm, but additional tissue was resected.  3 [place] nodes and 2 additional nodes were negative for tomor.  T1cN0 (i-)(sn)M0.\par \par She was started on Taxol/Herceptin on the [facility] 0373 protocol (A phase II adjuvant pacilitaxel and trastuzumab, for node negative, HER2 positive breast cancer).  She completed 1  year of herceptin 6/17/2009.  She also received adjuvant radiation therapy.  She has been JOSHUA since that time. \par \par Pertinent History:\par PMD: Dr. Rubin\par HTN, HLD\par Endocrinologist: Dr. Robles- DM\par GYN: Dr. Chavez, follows ovarian cyst, PAP every two years. \par Cardio: Dr. Washington- S/p open heart surgery and mitral valve replaced, Bovine valve, tricuspid valve repaired, PPM placed in 4/2019, h/o A.Fib on warfarin \par She has h/o "mini" seizures, last seizure activity one month ago.  On Trokendi, f/b neurology\par mild neuropathy in right leg and tips of fingers, taking cymbalta and gabapentin\par h/o thyroid cancer s/p thyroidectomy 2010. Followed by Dr. Robles\par Dexa 6/24/20, osteopenia, f/w rheum, Dr. Long takes ca+vit D\par has h/o anemia, follows with hematology, Dr. Richter\par Enodscopy/Colonoscopy 2019- tubular adenoma removed, repeat 5 years.\par The patient is a retired , she has one daughter and two grandchildren that live in [place].  She lives alone, she lost her significant other of 17 years last year, 2019.\par \par  [de-identified] : poorly differentiated invasive ductal carcinoma, ER/[place] negative HER-2/salas overexpression. [de-identified] : The patient presents today to establish ongoing survivorship care with me.  She is a former patient of Dr. Joyce Ayers, last seen 10/23/19.  She is doing well with no breast complaints. She follows up frequently with her PMD for blood work and exams.  She is overdue for her mammogram and sonogram. She was due in October; however had to cancel apt because she had a false positive covid test at that time.  Repeat swabs x 2 were negative.  She states she is going to reschedule apt.  She has been isolating at home, goes out only for doctor appointment.  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  She states her balance is sometime off because of this.  She is on cymbalta and gabapentin with improvement of symptoms.  She also feels as if she has "chemo brain" and is forgetful at time. She also states she has lipoma in upper left thigh that has been partial removed twice and biopsied in past by Dr. Montalvo.  Each time lipoma returns. She states it can not be completely removed because of location. It is monitored in past by Dr. Montalvo, patient states it has been stable with no change.  She states she has some hearing loss left ear and is pending hearing aide.  Other than that she has no complaints.\par SHe is f/b Dr. Mascorro at [facility]. for lipoma of the left leg. Patient states final pathology revealed sacoma. F/U appointment with Dr. Mascorro 12/23/22  for 12/15/22 CT scans results. She reports ongoing neuropathy to right foot with relief,  No falls, this past year.\par Symptoms in hands and left leg resolved, continues to take gabapentin 600 mg BID and cymbalta 60 mg OD prn with releif..\par MAmmo/Sono  BI-RADS 2 BIRADS 2\par DEXA 6/2020 osteopenia continue Vit D and CA suppls.Patient states she had DEXA this year 2022 \par colonoscopy 2021 will f/u q 4 years\par Eye MD:  1/2022 wears glasses \par F/b uro GYN for stress incontinence seen by GYN 2022 no PMB or discharge \par PCP 12/20/2021. Dr Rubin at [facility]\par Had Covid vaccine x 5  doses, most recent dose 9/2022\par up to date with Flu vaccine 10/2022 and shingles vaccine.\par Saw dentist 12/2022 no issues\par Pacemaker and defb exchanged 8/2022. No MRI 2/2 old pacemaker wires still in place \par  Patient has hx of migraine seizures F/b neurology Dr. Hernandez meds recently increased to 100 mg. No seizures recently.

## 2022-12-27 NOTE — PHYSICAL EXAM
[Normal] : affect appropriate [Fully active, able to carry on all pre-disease performance without restriction] : Status 0 - Fully active, able to carry on all pre-disease performance without restriction [de-identified] : anicteric, no conjunctival infection noted.  [de-identified] : thyroid surgery scar noted, no palpable masses noted [de-identified] : midline scar from cardiac surgery, pacemaker palpable in left chest wall. [de-identified] : no LE edema noted [de-identified] : No skin or nipple changes noted bilaterally.  Left breast with well healed surgical scar.  No discrete palpable masses noted bilaterally.  No palpable axillary adenopathy noted bilaterally. [de-identified] : Large lipoma present upper left thigh, soft, nontender.

## 2022-12-27 NOTE — ASSESSMENT
[FreeTextEntry1] : This is a 73-year-old lady who was diagnosed with stage IA left breast cancer (ER negative, HER-2/salas positive), status post left lumpectomy and axillary lymph node biopsy on 5/2/2008, status post Taxol Herceptin chemotherapy and radiation therapy. Presents for follow up.\par S/P 12/2021 admission at [facility]. for lipoma of the left leg F/b  Dr. 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 \par \par -Cardiomyopathy- stable. F/u cardio.Pacemaker and defb exchanged 8/2022. No MRI 2/2 old pacemaker wires still in place  No cardiac side effects of Herceptin.\par -Thyroid cancer- follows routinely with endocrinology.  Recent sonogram okay per patient.\par Ovarian cyst- F/B OB GYN.  PAP every two years, next due 2021 per patient. \par -Seizures- Not cancer related. Controlled with anti epileptics. \par -follow up routinely with Dr. Rubin for exam and  BW. PT, INR monitored by him once a week.\par -Osteopenia- followed by Rheumatology, last DEXA 6/30/22. Continue VIt D and Calcium.  \par -follow up one year, sooner if issues arise.\par -RHM: age appropriate screening discussed, patient states she is UTD.  She states she has annual skin check with dermatologist prn.\par RTO 1 year or sooner prn\par \par

## 2023-01-19 ENCOUNTER — APPOINTMENT (OUTPATIENT)
Dept: ENDOCRINOLOGY | Facility: CLINIC | Age: 78
End: 2023-01-19

## 2023-01-24 RX ORDER — BLOOD SUGAR DIAGNOSTIC
STRIP MISCELLANEOUS 3 TIMES DAILY
Qty: 3 | Refills: 1 | Status: ACTIVE | COMMUNITY
Start: 2022-10-13 | End: 1900-01-01

## 2023-05-17 RX ORDER — LANCETS 33 GAUGE
EACH MISCELLANEOUS
Qty: 3 | Refills: 1 | Status: ACTIVE | COMMUNITY
Start: 2022-10-13 | End: 1900-01-01

## 2023-05-17 RX ORDER — 70%ISOPROPYL ALCOHOL 0.7 ML/ML
70 SWAB TOPICAL
Qty: 3 | Refills: 1 | Status: ACTIVE | COMMUNITY
Start: 2022-10-13 | End: 1900-01-01

## 2023-11-21 ENCOUNTER — APPOINTMENT (OUTPATIENT)
Dept: ENDOCRINOLOGY | Facility: CLINIC | Age: 78
End: 2023-11-21
Payer: MEDICARE

## 2023-11-21 VITALS
WEIGHT: 125 LBS | BODY MASS INDEX: 23.6 KG/M2 | HEART RATE: 79 BPM | HEIGHT: 61 IN | DIASTOLIC BLOOD PRESSURE: 64 MMHG | SYSTOLIC BLOOD PRESSURE: 104 MMHG | OXYGEN SATURATION: 98 %

## 2023-11-21 LAB — HBA1C MFR BLD HPLC: 6.7

## 2023-11-21 PROCEDURE — 83036 HEMOGLOBIN GLYCOSYLATED A1C: CPT | Mod: QW

## 2023-11-21 PROCEDURE — 99213 OFFICE O/P EST LOW 20 MIN: CPT | Mod: 25

## 2023-11-22 ENCOUNTER — OUTPATIENT (OUTPATIENT)
Dept: OUTPATIENT SERVICES | Facility: HOSPITAL | Age: 78
LOS: 1 days | Discharge: HOME | End: 2023-11-22

## 2023-11-22 DIAGNOSIS — C50.919 MALIGNANT NEOPLASM OF UNSPECIFIED SITE OF UNSPECIFIED FEMALE BREAST: ICD-10-CM

## 2023-12-08 ENCOUNTER — APPOINTMENT (OUTPATIENT)
Dept: HEMATOLOGY ONCOLOGY | Facility: CLINIC | Age: 78
End: 2023-12-08

## 2023-12-19 ENCOUNTER — APPOINTMENT (OUTPATIENT)
Dept: MAMMOGRAPHY | Facility: IMAGING CENTER | Age: 78
End: 2023-12-19
Payer: MEDICARE

## 2023-12-19 ENCOUNTER — OUTPATIENT (OUTPATIENT)
Dept: OUTPATIENT SERVICES | Facility: HOSPITAL | Age: 78
LOS: 1 days | End: 2023-12-19
Payer: MEDICARE

## 2023-12-19 ENCOUNTER — RESULT REVIEW (OUTPATIENT)
Age: 78
End: 2023-12-19

## 2023-12-19 ENCOUNTER — APPOINTMENT (OUTPATIENT)
Dept: ULTRASOUND IMAGING | Facility: IMAGING CENTER | Age: 78
End: 2023-12-19
Payer: MEDICARE

## 2023-12-19 DIAGNOSIS — C50.919 MALIGNANT NEOPLASM OF UNSPECIFIED SITE OF UNSPECIFIED FEMALE BREAST: ICD-10-CM

## 2023-12-19 PROCEDURE — 77063 BREAST TOMOSYNTHESIS BI: CPT | Mod: 26

## 2023-12-19 PROCEDURE — 76641 ULTRASOUND BREAST COMPLETE: CPT

## 2023-12-19 PROCEDURE — 77067 SCR MAMMO BI INCL CAD: CPT | Mod: 26

## 2023-12-19 PROCEDURE — 76641 ULTRASOUND BREAST COMPLETE: CPT | Mod: 26,50,GY

## 2023-12-19 PROCEDURE — 77067 SCR MAMMO BI INCL CAD: CPT

## 2023-12-19 PROCEDURE — 77063 BREAST TOMOSYNTHESIS BI: CPT

## 2023-12-26 ENCOUNTER — RX RENEWAL (OUTPATIENT)
Age: 78
End: 2023-12-26

## 2024-02-09 ENCOUNTER — APPOINTMENT (OUTPATIENT)
Dept: INTERNAL MEDICINE | Facility: CLINIC | Age: 79
End: 2024-02-09
Payer: MEDICARE

## 2024-02-09 VITALS
BODY MASS INDEX: 24.17 KG/M2 | TEMPERATURE: 97.7 F | SYSTOLIC BLOOD PRESSURE: 137 MMHG | OXYGEN SATURATION: 97 % | DIASTOLIC BLOOD PRESSURE: 82 MMHG | RESPIRATION RATE: 16 BRPM | HEART RATE: 81 BPM | HEIGHT: 61 IN | WEIGHT: 128 LBS

## 2024-02-09 DIAGNOSIS — C73 MALIGNANT NEOPLASM OF THYROID GLAND: ICD-10-CM

## 2024-02-09 DIAGNOSIS — C50.919 MALIGNANT NEOPLASM OF UNSPECIFIED SITE OF UNSPECIFIED FEMALE BREAST: ICD-10-CM

## 2024-02-09 PROCEDURE — 99204 OFFICE O/P NEW MOD 45 MIN: CPT

## 2024-02-10 NOTE — HISTORY OF PRESENT ILLNESS
[FreeTextEntry1] : Overall feeling well.  Has an extensive medical history including LEFT breast cancer 2007, thyroid cancer 2010, LEFT thigh lipoma/sarcoma 2019, diabetes, coronary artery disease with an ICD, AFib on anticoagulation, also with a pacemaker s/p CABG x 2. Reports feeling dyspnea when she is exerting herself -- reports that it is any time she is exercising.  She gets echocardiograms every 6 months and her most recent EF=55% which has improved.  She did go to cardio-rehab for almost a year but then this was stopped because her insurance wouldn't cover it and she was paying out of pocket for some time. She stopped this maybe one month ago.   She has joined the [facility] -- she would like to go to the gym there fairly routinely. Going to help her sister in [place] who is to have a total hip replacement in the coming weeks -- she thinks once that is over, she will be able to return to a routine.

## 2024-02-10 NOTE — PLAN
[FreeTextEntry2] : 1) Breast cancer: Needs annual breast/chest wall exam.  Needs annual mammogram and ultrasound -- next due Aug 2024 and were ordered today.   2) Thyroid Cancer: follows with Endocrinology and is on levothyroxine. Due to see Endocrinology in April 2024.   3) Lipoma/sarcoma in LEFT thigh -- followed by serial CT scans of the LEFT leg.  [FreeTextEntry6] : Colonoscopy -- completed within the last 5 years. Gynecology -- due for Pap later this year in 2024.

## 2024-02-10 NOTE — HEALTH RISK ASSESSMENT
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  [FreeTextEntry6] : Has 1 daughter, age 48, has two grandchildren ages 10 and 8 -- they live in . She doesn't have too many friends. Her sister lives in [place].  [FreeTextEntry9] : Does dancing 1-2 times a week for 60 minutes, tries to stay active in the house. She is planning to go back to the gym more often once her sister's surgery is done.  [FreeTextEntry8] : Doesn't sleep in the bedroom, often falls asleep on the sofa. Does not feel tired during the day, she doesn't miss appointments.  [FreeTextEntry7] : Doesn't cook that often, but tries to get healthy foods like salads when she takes out.  Occasional bagels & cream cheese and occasional pizza. Doesn't really like too many sweets.  [de-identified] : no recent visits [de-identified] : second hand smoke

## 2024-02-10 NOTE — PHYSICAL EXAM
[No Acute Distress] : no acute distress [Well Nourished] : well nourished [No JVD] : no jugular venous distention [Normal Sclera/Conjunctiva] : normal sclera/conjunctiva [No Respiratory Distress] : no respiratory distress  [No Accessory Muscle Use] : no accessory muscle use [Clear to Auscultation] : lungs were clear to auscultation bilaterally [Normal Rate] : normal rate  [No Edema] : there was no peripheral edema [Normal Appearance] : normal in appearance [No Masses] : no palpable masses [No Nipple Discharge] : no nipple discharge [No Axillary Lymphadenopathy] : no axillary lymphadenopathy [Soft] : abdomen soft [No CVA Tenderness] : no CVA  tenderness [No Spinal Tenderness] : no spinal tenderness [Normal Affect] : the affect was normal [Normal Insight/Judgement] : insight and judgment were intact [de-identified] : regular rate on exam.  [de-identified] : Palpable breast tissue without discrete masses. Some evidence of breast tenderness and scar tissue around the surgical scar on the LEFT.

## 2024-02-10 NOTE — DISCUSSION/SUMMARY
[Cancer Type / Location / Histology Subtype: ________] : Cancer Type / Location / Histology Subtype: [unfilled] [Diagnosis Date (year): ____] : Diagnosis Date (year): [unfilled] [Surgery] : Surgery: Yes [Surgery Date(s) (year): ____] : Surgery Date(s) (year): [unfilled] [Surgical Procedure / Location / Findings: _________] : Surgical Procedure / Location / Findings: [unfilled] [Radiation] : Radiation: Yes [Body Area Treated: _________] : Body Area Treated: [unfilled] [Systemic Therapy (chemotherapy, hormonal therapy, other)] : Systemic Therapy (chemotherapy, hormonal therapy, other): Yes [Persistent symptoms or side effects at completion of treatment?  If Yes, list types ___] : Persistent symptoms or side effects at completion of treatment? Yes, [unfilled] [FreeTextEntry1] : 1) On routine mammogram in 4/2008 a new, irregular mass with calcification in the UOQ of left breast was discovered. Sonogram showed a 19 mm x 12 mm x 11 mm noncystic mass, lobular in contour and heterogeneous in echogenicity. Biopsy was recommended. BIRADS 5. Biopsy was done, 4/15/2008 and revealed poorly differentiated invasive duct carcinoma with necrosis. ER negative, MI negative, HER2 positive.  She underwent a left partial lumpectomy with SNLB, 5/2/2008 (Dr. Snow). Final pathology: Two tumors in the upper outer quadrant of the left breast with intraductal and infiltrating, poorly differentiated duct carcinoma, solid type, high nuclear grade, associated with necrosis, calcification, and lobular extension. Margins of both tumors were negative for tumor. The original tumor measured 1 cm in greatest dimension. The The additional nodule was more lateral in the upper otuer quadrant and mearued 1.5 cm i n greatest dimension. Both tumors wer comprised of 20% DCIS. Margins were within 1 mm, but additional tissue was resected. 3 Alvin nodes and 2 additional nodes were negative for tomor. T1cN0 (i-)(sn)M0. She was started on Taxol/Herceptin on the [facility] 0373 protocol (A phase II adjuvant pacilitaxel and trastuzumab, for node negative, HER2 positive breast cancer). She completed 1 year of herceptin 6/17/2009. She also received adjuvant radiation therapy. She has been JOSHUA since that time.  2) Thyroid cancer - s/p complete thyroidectomy in 2010 now on lifelong Synthroid.   3) Lipoma in LEFT leg has been removed/resected 3 times over the last 30 years. Most recent partial resection in 2019 had some evidence of sarcoma. Is now followed with serial CT scans to follow.

## 2024-04-04 ENCOUNTER — APPOINTMENT (OUTPATIENT)
Dept: ENDOCRINOLOGY | Facility: CLINIC | Age: 79
End: 2024-04-04
Payer: MEDICARE

## 2024-04-04 VITALS
WEIGHT: 125 LBS | BODY MASS INDEX: 23.6 KG/M2 | HEIGHT: 61 IN | SYSTOLIC BLOOD PRESSURE: 138 MMHG | HEART RATE: 82 BPM | OXYGEN SATURATION: 97 % | DIASTOLIC BLOOD PRESSURE: 78 MMHG

## 2024-04-04 DIAGNOSIS — M85.80 OTHER SPECIFIED DISORDERS OF BONE DENSITY AND STRUCTURE, UNSPECIFIED SITE: ICD-10-CM

## 2024-04-04 DIAGNOSIS — E11.9 TYPE 2 DIABETES MELLITUS W/OUT COMPLICATIONS: ICD-10-CM

## 2024-04-04 DIAGNOSIS — E89.0 POSTPROCEDURAL HYPOTHYROIDISM: ICD-10-CM

## 2024-04-04 DIAGNOSIS — E78.00 PURE HYPERCHOLESTEROLEMIA, UNSPECIFIED: ICD-10-CM

## 2024-04-04 LAB — HBA1C MFR BLD HPLC: 6.8

## 2024-04-04 PROCEDURE — 99214 OFFICE O/P EST MOD 30 MIN: CPT | Mod: 25

## 2024-04-04 PROCEDURE — 83036 HEMOGLOBIN GLYCOSYLATED A1C: CPT | Mod: QW

## 2024-04-04 RX ORDER — INSULIN GLARGINE 100 [IU]/ML
100 INJECTION, SOLUTION SUBCUTANEOUS AT BEDTIME
Qty: 3 | Refills: 2 | Status: ACTIVE | COMMUNITY
Start: 2019-10-28 | End: 1900-01-01

## 2024-04-04 NOTE — PHYSICAL EXAM
[Alert] : alert [No Acute Distress] : no acute distress [Well Developed] : well developed [Normal Voice/Communication] : normal voice communication [Normal Sclera/Conjunctiva] : normal sclera/conjunctiva [EOMI] : extra ocular movement intact [No Proptosis] : no proptosis [No Lid Lag] : no lid lag [No LAD] : no lymphadenopathy [Supple] : the neck was supple [Well Healed Scar] : well healed scar [No Respiratory Distress] : no respiratory distress [No Accessory Muscle Use] : no accessory muscle use [Normal Rate and Effort] : normal respiratory rate and effort [No Edema] : no peripheral edema [Normal Supraclavicular Nodes] : no supraclavicular lymphadenopathy [Normal Anterior Cervical Nodes] : no anterior cervical lymphadenopathy [No Stigmata of Cushings Syndrome] : no stigmata of Cushings Syndrome [Normal Gait] : normal gait [No Clubbing, Cyanosis] : no clubbing  or cyanosis of the fingernails [No Involuntary Movements] : no involuntary movements were seen [Right foot was examined, including] : right foot ~C was examined, including visual inspection with sensory and pulse exams [Left foot was examined, including] : left foot ~C was examined, including visual inspection with sensory and pulse exams [2+] : 2+ in the dorsalis pedis [No Tremors] : no tremors [Normal Sensation on Monofilament Testing] : normal sensation on monofilament testing of lower extremities [Oriented x3] : oriented to person, place, and time [Normal Insight/Judgement] : insight and judgment were intact [Acanthosis Nigricans] : no acanthosis nigricans [Foot Ulcers] : no foot ulcers

## 2024-04-04 NOTE — HISTORY OF PRESENT ILLNESS
[FreeTextEntry1] : VIJI GUZMAN  is a 78 year old female with past medical history of left breast cancer., now in remission, liposarcaoma, cAD s/p PCABG, type 2 DM, hypothyroidism, who presents today for management of diabetes and hypothyroidism  Diagnosed with DM 20 years. She is currently taking Lantus 14U at bedtime. She checks fs 3-4/dya, AM fasting ranges from , 2hr post meals ranges from 100-170. She denies recent hypoglycemic episodes or symptoms.She takes Cymbalta and Gabapentin for neuropathy. Praluent, Vascepa, and Lisinopril.  Used to take Metformin 5000mg po BID however stopped due to CKD.  She has seen Dr Miguel at [facility], no h/o retinopathy   Thyroid cancer was diagnosed in 2010, total thyroidectomy in [facility], Dr Montalvo and did not receive any SAMSON therapy. Thyroid sonogram unknown last imaging. Taking Synthroid brand 75mcg daily.No previous records received regarding sonogram or surgical pathology yet...Patient has no new complaint today

## 2024-04-04 NOTE — ASSESSMENT
[Diabetes Foot Care] : diabetes foot care [Long Term Vascular Complications] : long term vascular complications of diabetes [Carbohydrate Consistent Diet] : carbohydrate consistent diet [Importance of Diet and Exercise] : importance of diet and exercise to improve glycemic control, achieve weight loss and improve cardiovascular health [Hypoglycemia Management] : hypoglycemia management [Action and use of Insulin] : action and use of short and long-acting insulin [Self Monitoring of Blood Glucose] : self monitoring of blood glucose [Injection Technique, Storage, Sharps Disposal] : injection technique, storage, and sharps disposal [Retinopathy Screening] : Patient was referred to ophthalmology for retinopathy screening [Diabetic Medications] : Risks and benefits of diabetic medications were discussed [Levothyroxine] : The patient was instructed to take Levothyroxine on an empty stomach, separate from vitamins, and wait at least 30 minutes before eating [FreeTextEntry1] : Type 2 DM POC HgbA1c today is 6.8%, at goal Reviewed home glucose monitoring log, noted AM fasting is at goal. Has underlying CKD Continue Lantus 14U qhs Up to date with ophthalmology, no h/o diabetic retinopathy. No diabetic foot ulcers On Praluent and Vascepa for HLD  Papillary thyroid cancer s/p total thyroidectomy in 2010 Unable thus far to obtain records from [facility]/Dr Montalvo. Advised patient if she has records to send to this office Clinically euthyroid today Reviewed recent labs from PCP office in 11/7/23: normal TSH 0.10 Continue on Synthroid 75mcg daily  Osteopenia Reviewed DXA from June 2022, osteopenia of hips, advised to repeat DXA in 2024 Taking calcium and vitamin D2 50,000IU weekly.No h/o recent falls or fragility fractures as per patient today  Answered all questions today; patient verbalized understanding of the above RTO in 3 months

## 2024-06-13 ENCOUNTER — RX RENEWAL (OUTPATIENT)
Age: 79
End: 2024-06-13

## 2024-07-12 ENCOUNTER — LABORATORY RESULT (OUTPATIENT)
Age: 79
End: 2024-07-12

## 2024-07-12 ENCOUNTER — APPOINTMENT (OUTPATIENT)
Dept: ENDOCRINOLOGY | Facility: CLINIC | Age: 79
End: 2024-07-12

## 2024-07-12 VITALS
HEART RATE: 85 BPM | BODY MASS INDEX: 23.98 KG/M2 | RESPIRATION RATE: 16 BRPM | SYSTOLIC BLOOD PRESSURE: 110 MMHG | WEIGHT: 127 LBS | DIASTOLIC BLOOD PRESSURE: 60 MMHG | OXYGEN SATURATION: 96 % | HEIGHT: 61 IN

## 2024-07-12 DIAGNOSIS — E11.9 TYPE 2 DIABETES MELLITUS W/OUT COMPLICATIONS: ICD-10-CM

## 2024-07-12 DIAGNOSIS — E89.0 POSTPROCEDURAL HYPOTHYROIDISM: ICD-10-CM

## 2024-07-12 PROCEDURE — 99214 OFFICE O/P EST MOD 30 MIN: CPT | Mod: 25

## 2024-07-12 PROCEDURE — 36415 COLL VENOUS BLD VENIPUNCTURE: CPT

## 2024-07-15 LAB
T4 FREE SERPL-MCNC: 1.7 NG/DL
THYROGLOB AB SERPL-ACNC: <20 IU/ML
THYROGLOB SERPL-MCNC: 9.95 NG/ML
TSH SERPL-ACNC: 0.04 UIU/ML

## 2024-07-16 LAB — THYROGLOB AB SERPL IA-ACNC: <1.8 IU/ML

## 2024-09-05 ENCOUNTER — RX RENEWAL (OUTPATIENT)
Age: 79
End: 2024-09-05

## 2024-11-22 ENCOUNTER — APPOINTMENT (OUTPATIENT)
Dept: ENDOCRINOLOGY | Facility: CLINIC | Age: 79
End: 2024-11-22

## 2024-11-22 VITALS
DIASTOLIC BLOOD PRESSURE: 73 MMHG | SYSTOLIC BLOOD PRESSURE: 123 MMHG | BODY MASS INDEX: 24.46 KG/M2 | HEART RATE: 72 BPM | HEIGHT: 61 IN | WEIGHT: 129.56 LBS | OXYGEN SATURATION: 99 %

## 2024-11-22 DIAGNOSIS — E89.0 POSTPROCEDURAL HYPOTHYROIDISM: ICD-10-CM

## 2024-11-22 DIAGNOSIS — E11.9 TYPE 2 DIABETES MELLITUS W/OUT COMPLICATIONS: ICD-10-CM

## 2024-11-22 LAB
GLUCOSE BLDC GLUCOMTR-MCNC: 155
HBA1C MFR BLD HPLC: 7.9

## 2024-11-22 PROCEDURE — 82962 GLUCOSE BLOOD TEST: CPT

## 2024-11-22 PROCEDURE — 99214 OFFICE O/P EST MOD 30 MIN: CPT

## 2024-11-22 PROCEDURE — 83036 HEMOGLOBIN GLYCOSYLATED A1C: CPT | Mod: QW

## 2025-01-22 ENCOUNTER — RESULT REVIEW (OUTPATIENT)
Age: 80
End: 2025-01-22

## 2025-01-22 ENCOUNTER — APPOINTMENT (OUTPATIENT)
Dept: MAMMOGRAPHY | Facility: IMAGING CENTER | Age: 80
End: 2025-01-22
Payer: MEDICARE

## 2025-01-22 ENCOUNTER — APPOINTMENT (OUTPATIENT)
Dept: ULTRASOUND IMAGING | Facility: IMAGING CENTER | Age: 80
End: 2025-01-22
Payer: MEDICARE

## 2025-01-22 ENCOUNTER — OUTPATIENT (OUTPATIENT)
Dept: OUTPATIENT SERVICES | Facility: HOSPITAL | Age: 80
LOS: 1 days | End: 2025-01-22
Payer: MEDICARE

## 2025-01-22 DIAGNOSIS — C50.919 MALIGNANT NEOPLASM OF UNSPECIFIED SITE OF UNSPECIFIED FEMALE BREAST: ICD-10-CM

## 2025-01-22 DIAGNOSIS — Z00.8 ENCOUNTER FOR OTHER GENERAL EXAMINATION: ICD-10-CM

## 2025-01-22 PROCEDURE — 77067 SCR MAMMO BI INCL CAD: CPT

## 2025-01-22 PROCEDURE — 77063 BREAST TOMOSYNTHESIS BI: CPT | Mod: 26

## 2025-01-22 PROCEDURE — 77067 SCR MAMMO BI INCL CAD: CPT | Mod: 26

## 2025-01-22 PROCEDURE — 77063 BREAST TOMOSYNTHESIS BI: CPT

## 2025-01-22 PROCEDURE — 76641 ULTRASOUND BREAST COMPLETE: CPT

## 2025-01-22 PROCEDURE — 76641 ULTRASOUND BREAST COMPLETE: CPT | Mod: 26,50,GA

## 2025-02-07 ENCOUNTER — NON-APPOINTMENT (OUTPATIENT)
Age: 80
End: 2025-02-07

## 2025-02-07 ENCOUNTER — APPOINTMENT (OUTPATIENT)
Dept: INTERNAL MEDICINE | Facility: CLINIC | Age: 80
End: 2025-02-07
Payer: MEDICARE

## 2025-02-07 VITALS
SYSTOLIC BLOOD PRESSURE: 117 MMHG | OXYGEN SATURATION: 98 % | BODY MASS INDEX: 25.2 KG/M2 | TEMPERATURE: 97.3 F | DIASTOLIC BLOOD PRESSURE: 72 MMHG | HEART RATE: 67 BPM | RESPIRATION RATE: 16 BRPM | WEIGHT: 133.38 LBS

## 2025-02-07 DIAGNOSIS — C73 MALIGNANT NEOPLASM OF THYROID GLAND: ICD-10-CM

## 2025-02-07 DIAGNOSIS — M85.80 OTHER SPECIFIED DISORDERS OF BONE DENSITY AND STRUCTURE, UNSPECIFIED SITE: ICD-10-CM

## 2025-02-07 DIAGNOSIS — I42.9 CARDIOMYOPATHY, UNSPECIFIED: ICD-10-CM

## 2025-02-07 DIAGNOSIS — R93.89 ABNORMAL FINDINGS ON DIAGNOSTIC IMAGING OF OTHER SPECIFIED BODY STRUCTURES: ICD-10-CM

## 2025-02-07 DIAGNOSIS — C50.919 MALIGNANT NEOPLASM OF UNSPECIFIED SITE OF UNSPECIFIED FEMALE BREAST: ICD-10-CM

## 2025-02-07 PROCEDURE — 99214 OFFICE O/P EST MOD 30 MIN: CPT

## 2025-02-07 PROCEDURE — G2211 COMPLEX E/M VISIT ADD ON: CPT

## 2025-02-28 ENCOUNTER — APPOINTMENT (OUTPATIENT)
Dept: ENDOCRINOLOGY | Facility: CLINIC | Age: 80
End: 2025-02-28
Payer: MEDICARE

## 2025-02-28 VITALS
WEIGHT: 133 LBS | OXYGEN SATURATION: 99 % | DIASTOLIC BLOOD PRESSURE: 46 MMHG | HEART RATE: 73 BPM | BODY MASS INDEX: 25.11 KG/M2 | RESPIRATION RATE: 16 BRPM | HEIGHT: 61 IN | SYSTOLIC BLOOD PRESSURE: 94 MMHG

## 2025-02-28 DIAGNOSIS — I10 ESSENTIAL (PRIMARY) HYPERTENSION: ICD-10-CM

## 2025-02-28 DIAGNOSIS — C73 MALIGNANT NEOPLASM OF THYROID GLAND: ICD-10-CM

## 2025-02-28 DIAGNOSIS — E11.9 TYPE 2 DIABETES MELLITUS W/OUT COMPLICATIONS: ICD-10-CM

## 2025-02-28 DIAGNOSIS — E89.0 POSTPROCEDURAL HYPOTHYROIDISM: ICD-10-CM

## 2025-02-28 DIAGNOSIS — E78.00 PURE HYPERCHOLESTEROLEMIA, UNSPECIFIED: ICD-10-CM

## 2025-02-28 PROCEDURE — 99214 OFFICE O/P EST MOD 30 MIN: CPT

## 2025-07-10 ENCOUNTER — APPOINTMENT (OUTPATIENT)
Dept: ENDOCRINOLOGY | Facility: CLINIC | Age: 80
End: 2025-07-10
Payer: MEDICARE

## 2025-07-10 LAB — HBA1C MFR BLD HPLC: 6.8

## 2025-07-10 PROCEDURE — 99214 OFFICE O/P EST MOD 30 MIN: CPT

## 2025-07-10 PROCEDURE — 83036 HEMOGLOBIN GLYCOSYLATED A1C: CPT | Mod: QW

## 2025-07-16 PROBLEM — R79.89 ELEVATED CORTISOL LEVEL: Status: ACTIVE | Noted: 2025-07-16
